# Patient Record
Sex: FEMALE | Race: WHITE
[De-identification: names, ages, dates, MRNs, and addresses within clinical notes are randomized per-mention and may not be internally consistent; named-entity substitution may affect disease eponyms.]

---

## 2017-02-23 ENCOUNTER — HOSPITAL ENCOUNTER (OUTPATIENT)
Dept: HOSPITAL 45 - C.LAB1850 | Age: 22
Discharge: HOME | End: 2017-02-23
Attending: INTERNAL MEDICINE
Payer: COMMERCIAL

## 2017-02-23 DIAGNOSIS — J45.909: Primary | ICD-10-CM

## 2017-02-23 LAB
BASOPHILS # BLD: 0.01 K/UL (ref 0–0.2)
BASOPHILS NFR BLD: 0.1 %
COMPLETE: YES
EOSINOPHIL NFR BLD AUTO: 271 K/UL (ref 130–400)
HCT VFR BLD CALC: 41.6 % (ref 37–47)
IG%: 0.2 %
IMM GRANULOCYTES NFR BLD AUTO: 14.3 %
LYMPHOCYTES # BLD: 1.4 K/UL (ref 1.2–3.4)
MCH RBC QN AUTO: 30.9 PG (ref 25–34)
MCHC RBC AUTO-ENTMCNC: 35.1 G/DL (ref 32–36)
MCV RBC AUTO: 87.9 FL (ref 80–100)
MONOCYTES NFR BLD: 4.2 %
NEUTROPHILS # BLD AUTO: 0.1 %
NEUTROPHILS NFR BLD AUTO: 81.1 %
PMV BLD AUTO: 11.2 FL (ref 7.4–10.4)
RBC # BLD AUTO: 4.73 M/UL (ref 4.2–5.4)
WBC # BLD AUTO: 9.76 K/UL (ref 4.8–10.8)

## 2017-02-28 ENCOUNTER — HOSPITAL ENCOUNTER (OUTPATIENT)
Dept: HOSPITAL 45 - C.CTS | Age: 22
Discharge: HOME | End: 2017-02-28
Attending: INTERNAL MEDICINE
Payer: COMMERCIAL

## 2017-02-28 DIAGNOSIS — R05: Primary | ICD-10-CM

## 2017-02-28 DIAGNOSIS — J32.4: ICD-10-CM

## 2017-02-28 NOTE — DIAGNOSTIC IMAGING REPORT
CT SCAN OF THE PARANASAL SINUSES



CLINICAL HISTORY: Cough. Sinusitis.



COMPARISON STUDY:  No priors.



TECHNIQUE:  High-resolution CT scan of the paranasal sinuses is performed.

Images are reviewed in the axial, sagittal, and coronal planes. IV contrast was

not administered for this examination.



CT DOSE: 609.85 mGy.cm



FINDINGS:



Maxillary antra: Mild mucosal thickening seen on the right. Moderate to advanced

because of thickening within air-fluid level seen on the left. Hyperdense

secretions are noted in the left.



Anterior ethmoid sinuses: Opacified on the left, moderate to advanced mucosal

thickening on the right.



Posterior ethmoid sinuses: Subtotally opacified on the left. Mild mucosal

thickening on the right.



Sphenoid sinuses: Moderate mucosal thickening with air-fluid levels is seen

bilaterally.



Frontal sinuses: Mild to moderate mucosal thickening is seen on the right.

Moderate mucosal thickening is seen on the left with an air-fluid level.



Ostiomeatal complexes: Occluded bilaterally.



Frontoethmoidal and sphenoethmoidal recesses: The left sphenoethmoidal recess is

occluded. The right sphenoethmoidal recess is severely narrowed by mucosal

thickening but remains patent. The frontoethmoidal recesses are occluded

bilaterally.



Carotid arteries: The carotid arteries are covered and without septal

attachments.



Ethmoid roofs: There is asymmetric elevation of the left ethmoid roof as

compared to the right.



Nasal turbinates: Normal in appearance.



Nasal septum: There is rightward deviation of the bony nasal septum with a bony

spur.



Optic nerves: Covered.



Orbits: The bony orbits are intact. Orbital contents are normal in appearance.



Calvarium: The imaged calvarium is normal in appearance



Mastoid air cells: Well pneumatized.



Brain parenchyma: Partially visualized brain parenchyma is within normal limits.





IMPRESSION: Pansinusitis as above.







Electronically signed by:  Serge Chavez M.D.

2/28/2017 10:05 AM



Dictated Date/Time:  2/28/2017 9:59 AM

## 2017-03-21 ENCOUNTER — HOSPITAL ENCOUNTER (OUTPATIENT)
Dept: HOSPITAL 45 - C.LAB1850 | Age: 22
Discharge: HOME | End: 2017-03-21
Attending: INTERNAL MEDICINE
Payer: COMMERCIAL

## 2017-03-21 DIAGNOSIS — R07.9: Primary | ICD-10-CM

## 2018-04-07 ENCOUNTER — HOSPITAL ENCOUNTER (EMERGENCY)
Dept: HOSPITAL 45 - C.EDB | Age: 23
LOS: 1 days | Discharge: HOME | End: 2018-04-08
Payer: COMMERCIAL

## 2018-04-07 VITALS
HEIGHT: 62.01 IN | WEIGHT: 109.13 LBS | WEIGHT: 109.13 LBS | HEIGHT: 62.01 IN | BODY MASS INDEX: 19.83 KG/M2 | BODY MASS INDEX: 19.83 KG/M2

## 2018-04-07 DIAGNOSIS — Z98.1: ICD-10-CM

## 2018-04-07 DIAGNOSIS — Z88.8: ICD-10-CM

## 2018-04-07 DIAGNOSIS — Z88.0: ICD-10-CM

## 2018-04-07 DIAGNOSIS — R10.13: Primary | ICD-10-CM

## 2018-04-07 DIAGNOSIS — J45.909: ICD-10-CM

## 2018-04-07 DIAGNOSIS — Z88.1: ICD-10-CM

## 2018-04-07 DIAGNOSIS — Z82.49: ICD-10-CM

## 2018-04-07 LAB
ALBUMIN SERPL-MCNC: 4.9 GM/DL (ref 3.4–5)
ALP SERPL-CCNC: 79 U/L (ref 45–117)
ALT SERPL-CCNC: 26 U/L (ref 12–78)
AST SERPL-CCNC: 18 U/L (ref 15–37)
BASOPHILS # BLD: 0.01 K/UL (ref 0–0.2)
BASOPHILS NFR BLD: 0.2 %
BUN SERPL-MCNC: 12 MG/DL (ref 7–18)
CALCIUM SERPL-MCNC: 10.2 MG/DL (ref 8.5–10.1)
CO2 SERPL-SCNC: 25 MMOL/L (ref 21–32)
CREAT SERPL-MCNC: 0.8 MG/DL (ref 0.6–1.2)
EOS ABS #: 0.04 K/UL (ref 0–0.5)
EOSINOPHIL NFR BLD AUTO: 216 K/UL (ref 130–400)
GLUCOSE SERPL-MCNC: 85 MG/DL (ref 70–99)
HCT VFR BLD CALC: 47.1 % (ref 37–47)
HGB BLD-MCNC: 16.9 G/DL (ref 12–16)
IG#: 0.01 K/UL (ref 0–0.02)
IMM GRANULOCYTES NFR BLD AUTO: 24.9 %
LIPASE: 263 U/L (ref 73–393)
LYMPHOCYTES # BLD: 1.55 K/UL (ref 1.2–3.4)
MCH RBC QN AUTO: 31.6 PG (ref 25–34)
MCHC RBC AUTO-ENTMCNC: 35.9 G/DL (ref 32–36)
MCV RBC AUTO: 88.2 FL (ref 80–100)
MONO ABS #: 0.58 K/UL (ref 0.11–0.59)
MONOCYTES NFR BLD: 9.3 %
NEUT ABS #: 4.04 K/UL (ref 1.4–6.5)
NEUTROPHILS # BLD AUTO: 0.6 %
NEUTROPHILS NFR BLD AUTO: 64.8 %
PMV BLD AUTO: 11.3 FL (ref 7.4–10.4)
POTASSIUM SERPL-SCNC: 3.2 MMOL/L (ref 3.5–5.1)
PROT SERPL-MCNC: 9.4 GM/DL (ref 6.4–8.2)
RED CELL DISTRIBUTION WIDTH CV: 12.2 % (ref 11.5–14.5)
RED CELL DISTRIBUTION WIDTH SD: 39 FL (ref 36.4–46.3)
SODIUM SERPL-SCNC: 137 MMOL/L (ref 136–145)
WBC # BLD AUTO: 6.23 K/UL (ref 4.8–10.8)

## 2018-04-07 NOTE — EMERGENCY ROOM VISIT NOTE
History


Report prepared by Gayatri:  Richard Wells


Under the Supervision of:  Dr. Israel High M.D.


First contact with patient:  20:01


Chief Complaint:  ABDOMINAL PAIN


Stated Complaint:  ABD PAIN


Nursing Triage Summary:  


lower abd pain that radiates to back





History of Present Illness


The patient is a 22 year old female who presents to the Emergency Room with 

complaints of waxing and waning abdominal pain that started a couple months 

ago. She states that she has been feeling generally unwell over the past couple 

months, with mostly constant nausea and general fatigue. The patient states 

that she has also had muscle, joint, and bone pain "all over". She says that 

eating makes her abdominal pain worse. The patient notes that last night, she 

laid down and her legs went "dead", which was followed up by abdominal pain, 

and more full body pain. She notes that she has to force herself to eat due to 

her nausea. The patient says that while taking a bath earlier today, she had 

yellow vaginal discharge. She notes that she saw her OB/GYN 2 days ago and 

everything seemed normal, but she did not have cultures done. The patient adds 

that she has seen her primary care physician for her symptoms, and had an 

ultrasound done of her gallbladder, which came back normal. She notes that she 

has been constipated recently. The patient states that for the past several 

years, she has been getting lightheaded when sitting up. She got tested for 

POTS and it was negative. The patient says that she got checked for Lyme a year 

ago and it was negative. She notes that she has not noticed any tick bites or 

weird rashes, and she does not work in the woods. She says that her last 

menstrual period ended 2 days ago, and she denies any chance of retaining a 

tampon or chance or pregnancy.





   Source of History:  patient


   Onset:  A couple months ago


   Position:  abdomen


   Quality:  other (pain)


   Timing:  waxes/wanes


   Modifying Factors (Worsening):  eating


   Associated Symptoms:  + nausea, + fatigue


Note:


Associated symptoms: Full body pain. Yellow vaginal discharge yesterday. Legs 

"went dead" last night.





Review of Systems


See HPI for pertinent positives & negatives. A total of 10 systems reviewed and 

were otherwise negative.





Past Medical & Surgical


Medical Problems:


(1) Asthma


(2) Chronic fatigue


Surgical Problems:


(1) History of spinal fusion








Family History





Cancer


Heart disease


Hypertension





Social History


Smoking Status:  Never Smoker


Drug Use:  none


Marital Status:  single


Occupation Status:  employed





Current/Historical Medications


Scheduled


Fexofenadine Hcl (Allegra), 180 MG PO DAILY


Montelukast Sodium (Singulair), 10 MG PO DAILY





Allergies


Coded Allergies:  


     Amoxicillin (Unverified  Allergy, Unknown, ., 4/7/18)


     Cephalexin (Unverified  Allergy, Unknown, ., 4/7/18)


     Ciprofloxacin (Unverified  Allergy, Unknown, ., 4/7/18)


     Citalopram (Unverified  Allergy, Unknown, ., 4/7/18)





Physical Exam


Vital Signs











  Date Time  Temp Pulse Resp B/P (MAP) Pulse Ox O2 Delivery O2 Flow Rate FiO2


 


4/8/18 00:04 36.3 82 18 108/66 99   


 


4/7/18 22:44  90 18 113/68 99 Room Air  


 


4/7/18 21:29  92 18 113/68 96 Room Air  


 


4/7/18 20:31  92      


 


4/7/18 20:28  78 21 115/66 100 Room Air  


 


4/7/18 19:49 36.3 130 18 121/74 99 Room Air  











Physical Exam


GENERAL: Awake, alert, tearful on exam, in no acute distress


HENT: Normocephalic, atraumatic. Oropharynx unremarkable.


EYES: Normal conjunctiva. Sclera non-icteric.


NECK: Supple. No nuchal rigidity. FROM. No JVD.


RESPIRATORY: Clear to auscultation.


CARDIAC: Regular rate, normal rhythm. Extremities warm and well perfused. 

Pulses equal.


ABDOMEN: Soft, non-distended. Tender in the left lower quadrant. No rebound or 

guarding. No masses.


RECTAL: Deferred.


MUSCULOSKELETAL: Chest examination reveals no tenderness. The back is 

symmetrical on inspection without obvious abnormality. There is no CVA 

tenderness to palpation. No joint edema. 


LOWER EXTREMITIES: Calves are equal size bilaterally and non-tender. No edema. 

No discoloration. 


NEURO: Normal sensorium. No sensory or motor deficits noted. 


SKIN: No rash or jaundice noted.





Medical Decision & Procedures


ER Provider


Diagnostic Interpretation:


CT ABD/PELVIS IV AND ORAL CONT





CLINICAL HISTORY: Diffuse abdominal pain    





COMPARISON STUDY:  None.





TECHNIQUE: Following the IV administration of 91 mL of Optiray-320, CT scan of


the abdomen and pelvis was performed from the lung bases to the proximal femurs.


Images are reviewed in the axial, sagittal, and coronal planes. IV contrast was


administered without complication.  A dose lowering technique was utilized


adhering to the principles of ALARA.








CT DOSE: 252.11 mGy.cm





FINDINGS:





Lower chest: The heart is normal in size and configuration, without pericardial


effusion. The lung bases and pleural spaces are clear.





Liver: The contrast-enhanced liver is normal in size, contour, and attenuation.


There is no intrahepatic biliary ductal dilatation. The hepatic veins and portal


veins are patent.





Gallbladder: Unremarkable.





Spleen: Normal in size and attenuation.





Pancreas: Unremarkable.





Adrenal glands: Unremarkable.





Kidneys: There is symmetric renal cortical enhancement. The kidneys are normal


in size without hydronephrosis.





Bowel: There are no transition zones to indicate bowel obstruction. There is no


acute diverticulitis. The appendix appears normal.





Peritoneum: There is no intraperitoneal free air or abdominal ascites.





Vasculature: The abdominal aorta is normal in course and caliber.





Adenopathy: None.





Pelvic viscera: The bladder, and pelvic viscera are unremarkable.





Skeletal structures: There are postsurgical changes of thoracolumbar spinal


rodding. There is secondary artifact from the metallic hardware. There is an old


ununited right ninth rib fracture.





IMPRESSION:  


1. Postsurgical changes within the spine with secondary streak artifact


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


3. No acute intra-abdominal or pelvic findings.


4. No evidence of bowel obstruction. No evidence of free air


5. Normal appendix. No evidence of acute diverticulitis.











Electronically signed by:  Chaz Gray M.D.


4/8/2018 6:45 AM





Dictated Date/Time:  4/8/2018 6:42 AM





 The status of this report is Signed.   


 Draft = Not yet reviewed or approved by Radiologist.  


 Signed = Reviewed and approved by Radiologist.





Laboratory Results


4/7/18 20:20








Red Blood Count 5.34, Mean Corpuscular Volume 88.2, Mean Corpuscular Hemoglobin 

31.6, Mean Corpuscular Hemoglobin Concent 35.9, Mean Platelet Volume 11.3, 

Neutrophils (%) (Auto) 64.8, Lymphocytes (%) (Auto) 24.9, Monocytes (%) (Auto) 

9.3, Eosinophils (%) (Auto) 0.6, Basophils (%) (Auto) 0.2, Neutrophils # (Auto) 

4.04, Lymphocytes # (Auto) 1.55, Monocytes # (Auto) 0.58, Eosinophils # (Auto) 

0.04, Basophils # (Auto) 0.01





4/7/18 20:20

















Test


  4/7/18


20:20 4/7/18


21:25


 


White Blood Count


  6.23 K/uL


(4.8-10.8) 


 


 


Red Blood Count


  5.34 M/uL


(4.2-5.4) 


 


 


Hemoglobin


  16.9 g/dL


(12.0-16.0) 


 


 


Hematocrit 47.1 % (37-47)  


 


Mean Corpuscular Volume


  88.2 fL


() 


 


 


Mean Corpuscular Hemoglobin


  31.6 pg


(25-34) 


 


 


Mean Corpuscular Hemoglobin


Concent 35.9 g/dl


(32-36) 


 


 


Platelet Count


  216 K/uL


(130-400) 


 


 


Mean Platelet Volume


  11.3 fL


(7.4-10.4) 


 


 


Neutrophils (%) (Auto) 64.8 %  


 


Lymphocytes (%) (Auto) 24.9 %  


 


Monocytes (%) (Auto) 9.3 %  


 


Eosinophils (%) (Auto) 0.6 %  


 


Basophils (%) (Auto) 0.2 %  


 


Neutrophils # (Auto)


  4.04 K/uL


(1.4-6.5) 


 


 


Lymphocytes # (Auto)


  1.55 K/uL


(1.2-3.4) 


 


 


Monocytes # (Auto)


  0.58 K/uL


(0.11-0.59) 


 


 


Eosinophils # (Auto)


  0.04 K/uL


(0-0.5) 


 


 


Basophils # (Auto)


  0.01 K/uL


(0-0.2) 


 


 


RDW Standard Deviation


  39.0 fL


(36.4-46.3) 


 


 


RDW Coefficient of Variation


  12.2 %


(11.5-14.5) 


 


 


Immature Granulocyte % (Auto) 0.2 %  


 


Immature Granulocyte # (Auto)


  0.01 K/uL


(0.00-0.02) 


 


 


Anion Gap


  9.0 mmol/L


(3-11) 


 


 


Est Creatinine Clear Calc


Drug Dose 84.7 ml/min 


  


 


 


Estimated GFR (


American) 119.6 


  


 


 


Estimated GFR (Non-


American 103.2 


  


 


 


BUN/Creatinine Ratio 14.9 (10-20)  


 


Calcium Level


  10.2 mg/dl


(8.5-10.1) 


 


 


Total Bilirubin


  0.5 mg/dl


(0.2-1) 


 


 


Direct Bilirubin


  0.1 mg/dl


(0-0.2) 


 


 


Aspartate Amino Transf


(AST/SGOT) 18 U/L (15-37) 


  


 


 


Alanine Aminotransferase


(ALT/SGPT) 26 U/L (12-78) 


  


 


 


Alkaline Phosphatase


  79 U/L


() 


 


 


Total Protein


  9.4 gm/dl


(6.4-8.2) 


 


 


Albumin


  4.9 gm/dl


(3.4-5.0) 


 


 


Lipase


  263 U/L


() 


 


 


Monoscreen NEG (NEG)  


 


Urine Color  YELLOW 


 


Urine Appearance  CLEAR (CLEAR) 


 


Urine pH  6.0 (4.5-7.5) 


 


Urine Specific Gravity


  


  1.007


(1.000-1.030)


 


Urine Protein  NEG (NEG) 


 


Urine Glucose (UA)  NEG (NEG) 


 


Urine Ketones  1+ (NEG) 


 


Urine Occult Blood  NEG (NEG) 


 


Urine Nitrite  NEG (NEG) 


 


Urine Bilirubin  NEG (NEG) 


 


Urine Urobilinogen  NEG (NEG) 


 


Urine Leukocyte Esterase  SMALL (NEG) 


 


Urine WBC (Auto)  1-5 /hpf (0-5) 


 


Urine RBC (Auto)  0-4 /hpf (0-4) 


 


Urine Hyaline Casts (Auto)  0 /lpf (0-5) 


 


Urine Epithelial Cells (Auto)


  


  5-10 /lpf


(0-5)


 


Urine Bacteria (Auto)  NEG (NEG) 


 


Urine Pregnancy Test  NEG (NEG) 





Labs reviewed by ED physician.





Medications Administered











 Medications


  (Trade)  Dose


 Ordered  Sig/Umm


 Route  Start Time


 Stop Time Status Last Admin


Dose Admin


 


 Sodium Chloride  1,000 ml @ 


 999 mls/hr  Q1H1M STAT


 IV  4/7/18 20:13


 4/7/18 21:13 DC 4/7/18 20:24


999 MLS/HR


 


 Miscellaneous


 Medication


  (Gi Cocktail)  24 ml  NOW  STAT


 PO  4/7/18 20:13


 4/7/18 20:15 DC 4/7/18 20:23


24 ML


 


 Famotidine


  (Pepcid Tab)  20 mg  NOW  STAT


 PO  4/7/18 20:13


 4/7/18 20:15 DC 4/7/18 20:23


20 MG


 


 Sucralfate


  (Carafate Tab)  1 gm  NOW  STAT


 PO  4/7/18 20:13


 4/7/18 20:15 DC 4/7/18 20:23


1 GM


 


 Metoclopramide HCl


  (Reglan Inj)  10 mg  NOW  STAT


 IV  4/7/18 21:13


 4/7/18 21:14 DC 4/7/18 21:24


10 MG


 


 Potassium Chloride


  (Klor-Con M10)  40 meq  STK-MED ONCE


 .ROUTE  4/7/18 21:20


 4/7/18 21:21 DC 4/7/18 21:24


40 MEQ











ED Course


2005: Past medical records reviewed. The patient was evaluated in room B10. A 

complete history and physical examination was performed. 





2013: Carafate Tab 1 gm PO, Pepcid Tab 20 mg PO, GI Cocktail 24 ml PO, NSS 1000 

ml @ 999 mls/hr IV.





2113: Reglan Inj 10 mg IV.





2114: Klor-Con Tab 40 meq PO.





2147: I reevaluated the patient and she is doing better.





Medical Decision


Differential diagnosis:


Etiologies such as appendicitis, diverticulitis, PUD, biliary pathology, UTI, 

pancreatitis, obstruction, mesenteric ischemia, aortic pathology, infections, 

inflammatory bowel disease, renal colic, as well as others were entertained. 





This is a 22-year-old female who presents the emergency department complaining 

of epigastric pain.  The patient was sent for CAT scan of the abdomen and 

pelvis however this does not show any acute process.  In addition the patient 

was given a GI cocktail, Pepcid and Carafate along with Reglan.  Repeat 

abdominal examination revealed improvement in the patient's symptoms.  I do 

feel that the patient is well enough to be discharged home however I stressed 

the need for clear liquid diet over the next 48 hours along with Pepcid or 

Prilosec at bedtime.  In addition serial abdominal examinations were performed 

on the patient in the emergency department and at no time to the patient 

exhibited a surgical abdomen.  Patient and mother were in agreement with the 

treatment plan.





Medication Reconcilliation


Current Medication List:  was personally reviewed by me





Blood Pressure Screening


Patient's blood pressure:  Normal blood pressure





Impression





 Primary Impression:  


 Abdominal pain





Scribe Attestation


The scribe's documentation has been prepared under my direction and personally 

reviewed by me in its entirety. I confirm that the note above accurately 

reflects all work, treatment, procedures, and medical decision making performed 

by me.





Departure Information


Dispostion


Home / Self-Care





Referrals


Lety Arguelles D.O. (PCP)





Patient Instructions


My [facility]





Problem Qualifiers








 Primary Impression:  


 Abdominal pain


 Abdominal location:  epigastric  Qualified Codes:  R10.13 - Epigastric pain

## 2018-04-08 VITALS
OXYGEN SATURATION: 99 % | HEART RATE: 82 BPM | DIASTOLIC BLOOD PRESSURE: 66 MMHG | TEMPERATURE: 97.34 F | SYSTOLIC BLOOD PRESSURE: 108 MMHG

## 2018-04-08 NOTE — DIAGNOSTIC IMAGING REPORT
CT ABD/PELVIS IV AND ORAL CONT



CLINICAL HISTORY: Diffuse abdominal pain    



COMPARISON STUDY:  None.



TECHNIQUE: Following the IV administration of 91 mL of Optiray-320, CT scan of

the abdomen and pelvis was performed from the lung bases to the proximal femurs.

Images are reviewed in the axial, sagittal, and coronal planes. IV contrast was

administered without complication.  A dose lowering technique was utilized

adhering to the principles of ALARA.





CT DOSE: 252.11 mGy.cm



FINDINGS:



Lower chest: The heart is normal in size and configuration, without pericardial

effusion. The lung bases and pleural spaces are clear.



Liver: The contrast-enhanced liver is normal in size, contour, and attenuation.

There is no intrahepatic biliary ductal dilatation. The hepatic veins and portal

veins are patent.



Gallbladder: Unremarkable.



Spleen: Normal in size and attenuation.



Pancreas: Unremarkable.



Adrenal glands: Unremarkable.



Kidneys: There is symmetric renal cortical enhancement. The kidneys are normal

in size without hydronephrosis.



Bowel: There are no transition zones to indicate bowel obstruction. There is no

acute diverticulitis. The appendix appears normal.



Peritoneum: There is no intraperitoneal free air or abdominal ascites.



Vasculature: The abdominal aorta is normal in course and caliber.



Adenopathy: None.



Pelvic viscera: The bladder, and pelvic viscera are unremarkable.



Skeletal structures: There are postsurgical changes of thoracolumbar spinal

rodding. There is secondary artifact from the metallic hardware. There is an old

ununited right ninth rib fracture.



IMPRESSION:  

1. Postsurgical changes within the spine with secondary streak artifact

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

3. No acute intra-abdominal or pelvic findings.

4. No evidence of bowel obstruction. No evidence of free air

5. Normal appendix. No evidence of acute diverticulitis.







Electronically signed by:  Chaz Gray M.D.

4/8/2018 6:45 AM



Dictated Date/Time:  4/8/2018 6:42 AM

## 2018-04-10 LAB — EBV EA IGG SER-ACNC: < 9 U/ML

## 2018-04-13 ENCOUNTER — HOSPITAL ENCOUNTER (EMERGENCY)
Dept: HOSPITAL 45 - C.EDB | Age: 23
Discharge: HOME | End: 2018-04-13
Payer: COMMERCIAL

## 2018-04-13 VITALS
HEIGHT: 62.01 IN | HEIGHT: 62.01 IN | BODY MASS INDEX: 19.19 KG/M2 | WEIGHT: 105.6 LBS | WEIGHT: 105.6 LBS | BODY MASS INDEX: 19.19 KG/M2

## 2018-04-13 VITALS — HEART RATE: 68 BPM | OXYGEN SATURATION: 99 % | DIASTOLIC BLOOD PRESSURE: 70 MMHG | SYSTOLIC BLOOD PRESSURE: 108 MMHG

## 2018-04-13 VITALS — TEMPERATURE: 97.88 F

## 2018-04-13 DIAGNOSIS — R53.82: ICD-10-CM

## 2018-04-13 DIAGNOSIS — Z80.9: ICD-10-CM

## 2018-04-13 DIAGNOSIS — Z79.899: ICD-10-CM

## 2018-04-13 DIAGNOSIS — R10.84: ICD-10-CM

## 2018-04-13 DIAGNOSIS — Z98.1: ICD-10-CM

## 2018-04-13 DIAGNOSIS — R07.9: Primary | ICD-10-CM

## 2018-04-13 DIAGNOSIS — Z88.0: ICD-10-CM

## 2018-04-13 DIAGNOSIS — Z82.49: ICD-10-CM

## 2018-04-13 DIAGNOSIS — Z88.8: ICD-10-CM

## 2018-04-13 DIAGNOSIS — J45.909: ICD-10-CM

## 2018-04-13 DIAGNOSIS — Z88.1: ICD-10-CM

## 2018-04-13 LAB
ALBUMIN SERPL-MCNC: 4.4 GM/DL (ref 3.4–5)
ALP SERPL-CCNC: 60 U/L (ref 45–117)
ALT SERPL-CCNC: 21 U/L (ref 12–78)
AST SERPL-CCNC: 19 U/L (ref 15–37)
BASOPHILS # BLD: 0.01 K/UL (ref 0–0.2)
BASOPHILS NFR BLD: 0.1 %
BUN SERPL-MCNC: 6 MG/DL (ref 7–18)
CALCIUM SERPL-MCNC: 9.4 MG/DL (ref 8.5–10.1)
CO2 SERPL-SCNC: 26 MMOL/L (ref 21–32)
CREAT SERPL-MCNC: 0.86 MG/DL (ref 0.6–1.2)
EOS ABS #: 0.05 K/UL (ref 0–0.5)
EOSINOPHIL NFR BLD AUTO: 220 K/UL (ref 130–400)
GLUCOSE SERPL-MCNC: 84 MG/DL (ref 70–99)
HCT VFR BLD CALC: 42.7 % (ref 37–47)
HGB BLD-MCNC: 15 G/DL (ref 12–16)
IG#: 0.02 K/UL (ref 0–0.02)
IMM GRANULOCYTES NFR BLD AUTO: 20.3 %
LIPASE: 284 U/L (ref 73–393)
LYMPHOCYTES # BLD: 2.06 K/UL (ref 1.2–3.4)
MCH RBC QN AUTO: 30.9 PG (ref 25–34)
MCHC RBC AUTO-ENTMCNC: 35.1 G/DL (ref 32–36)
MCV RBC AUTO: 87.9 FL (ref 80–100)
MONO ABS #: 0.76 K/UL (ref 0.11–0.59)
MONOCYTES NFR BLD: 7.5 %
NEUT ABS #: 7.25 K/UL (ref 1.4–6.5)
NEUTROPHILS # BLD AUTO: 0.5 %
NEUTROPHILS NFR BLD AUTO: 71.4 %
PMV BLD AUTO: 11.1 FL (ref 7.4–10.4)
POTASSIUM SERPL-SCNC: 3.4 MMOL/L (ref 3.5–5.1)
PROT SERPL-MCNC: 8.4 GM/DL (ref 6.4–8.2)
RED CELL DISTRIBUTION WIDTH CV: 12.2 % (ref 11.5–14.5)
RED CELL DISTRIBUTION WIDTH SD: 39.1 FL (ref 36.4–46.3)
SODIUM SERPL-SCNC: 139 MMOL/L (ref 136–145)
WBC # BLD AUTO: 10.15 K/UL (ref 4.8–10.8)

## 2018-04-13 NOTE — EMERGENCY ROOM VISIT NOTE
History


Report prepared by Gayatri:  Che Shah


Under the Supervision of:  Dr. Michael Coles D.O.


First contact with patient:  15:22


Chief Complaint:  CHEST PAIN


Stated Complaint:  CHEST PAIN


Nursing Triage Summary:  


Patient ambulatory to triage with an upright and steady gait, states "I had an 


upper GI performed by Dr. Huggins around 1100 yesterday. I woke up from 

the 


procedure with chest pain in the center of my chest. I have been nauseated and 


have had a lot of heart burn. I had a fever last night throughout the night. I 


have been using tums and prilosec for the pain with no relief."





History of Present Illness


The patient is a 22 year old female who presents to the Emergency Room with 

complaints of constant chest pain beginning this morning. The patient notes 

having heart burn as well. The patient reports she had a upper endoscopy by Dr. Huggins yesterday for ongoing "stomach issues". Per mother, the patient 

did not get any results from her endoscopy yesterday. She states she has had 

worsening generalized abdominal pain for the last week.  Abdominal pain is 

truly been there for the past 3 months.  She notes some nausea. The patient 

notes some vaginal spotting and vaginal discharge today.  Abdominal pain is 

fairly constant in nature now in the left upper quadrant although this can be 

diffuse.  No true exacerbating or remitting factors.  The patient states she 

has had some abdominal cramping which is followed by vaginal bleeding for the 

past couple weeks. She reports difficulty emptying her bladder and abdominal 

pain when she urinates beginning a couple weeks ago. Pt denies headache, change 

in vision, fevers, shortness of breath, nausea, vomiting, diarrhea,  and 

melena. Patient denies diabetes, hypertension, hyperlipidemia, CAD, history of 

sudden death at a young age, and smoking.  Patient denies swelling of calves, 

recent trips, history of immobilization or recent surgery, prior history of DVT

, hemoptysis, history of malignancy, history of smoking, or birth control/

estrogen use. 











The patient had a negative CT on April 7th. 





EGD from 4/12/18 Impression:  Normal esophagus. Biopsied. Normal stomach. 

Biopsied. Normal examined duodenum. Biopsied. 





US PELVIS TRANS-ABDOMINAL/TRANSVAGINAL from 4/11/18 Impression: Unremarkable 

pelvic ultrasound. 





US ABDOMEN from 3/26/18 Impression: Normal sonographic evaluation of the right 

upper quadrant.





   Source of History:  patient


   Onset:  this morning


   Position:  chest


   Quality:  other (pain)


   Timing:  constant


   Associated Symptoms:  + chest pain, + nausea, + abdominal pain, + urinary 

symptoms, No fevers, No headache, No SOB, No vomiting, No diarrhea





Review of Systems


See HPI for pertinent positives & negatives. A total of 10 systems reviewed and 

were otherwise negative.





Past Medical & Surgical


Medical Problems:


(1) Asthma


(2) Chronic fatigue


Surgical Problems:


(1) History of spinal fusion








Family History





Cancer


Heart disease


Hypertension





Social History


Smoking Status:  Never Smoker


Drug Use:  none


Marital Status:  single


Occupation Status:  employed





Current/Historical Medications


Scheduled


Fexofenadine Hcl (Allegra), 180 MG PO DAILY


Montelukast Sodium (Singulair), 10 MG PO DAILY


Sucralfate (Carafate), 1 GM PO QID





Allergies


Coded Allergies:  


     Amoxicillin (Unverified  Allergy, Unknown, ., 4/7/18)


     Cephalexin (Unverified  Allergy, Unknown, ., 4/7/18)


     Ciprofloxacin (Unverified  Allergy, Unknown, ., 4/7/18)


     Citalopram (Unverified  Allergy, Unknown, ., 4/7/18)





Physical Exam


Vital Signs











  Date Time  Temp Pulse Resp B/P (MAP) Pulse Ox O2 Delivery O2 Flow Rate FiO2


 


4/13/18 15:57  76 20 102/65 98 Room Air  


 


4/13/18 14:56 36.6 99 18 120/73 99 Room Air  


 


4/13/18 14:54     99 Room Air  











Physical Exam


GENERAL: Sitting up in bed, alert, well appearing, holding her abdomen, no 

distress, non-toxic 


EYE EXAM: normal conjunctiva. 


OROPHARYNX: no exudate, no erythema, lips, buccal mucosa, and tongue normal and 

mucous membranes are moist


NECK: supple, no nuchal rigidity, no adenopathy, non-tender


CHEST: Minimal tenderness over bilateral anterior chest wall


LUNGS: Clear to auscultation. Normal chest wall mechanics


HEART: no murmurs, S1 normal and S2 normal 


ABDOMEN: mild diffuse tenderness. abdomen soft, normo-active bowel sounds, no 

masses, no rebound or guarding. 


BACK: Back is symmetrical on inspection and there is no deformity, no midline 

tenderness, no CVA tenderness. 


SKIN: no rashes and no bruising 


UPPER EXTREMITIES: upper extremities are grossly normal. 


LOWER EXTREMITIES: No pitting edema. Calves equal bilaterally. 


NEURO EXAM: Normal sensorium, cranial nerves II-XII grossly intact, normal 

speech,  no gross weakness of arms, no gross weakness of legs.





Medical Decision & Procedures


ER Provider


Diagnostic Interpretation:


Radiology results as stated below per my review and the radiologist's 

interpretation: 





CHEST ONE VIEW PORTABLE





FINDINGS:


Cardiomediastinal silhouette normal. Mild hyperinflation. No focal opacity. No


large effusion or pneumothorax. Extended thoracolumbar fusion hardware. No


significant scoliosis. Upper abdomen normal.





IMPRESSION:


1.  No acute cardiopulmonary disease.








Electronically signed by:  Donnie Avery M.D.





KUB





FINDINGS:


Nonobstructive bowel gas pattern. Mild stool burden throughout the colon. No


gross pneumoperitoneum.





Allowing for bowel gas and stool, no calcifications to suggest nephrolithiasis.





Thoracolumbar posterior fusion hardware. No significant scoliosis. No acute


osseous injury is apparent. Lung bases clear. 





IMPRESSION:


1.  No acute intra-abdominal pathology.











Electronically signed by:  Donnie Avery M.D.





Laboratory Results


4/13/18 15:36








Red Blood Count 4.86, Mean Corpuscular Volume 87.9, Mean Corpuscular Hemoglobin 

30.9, Mean Corpuscular Hemoglobin Concent 35.1, Mean Platelet Volume 11.1, 

Neutrophils (%) (Auto) 71.4, Lymphocytes (%) (Auto) 20.3, Monocytes (%) (Auto) 

7.5, Eosinophils (%) (Auto) 0.5, Basophils (%) (Auto) 0.1, Neutrophils # (Auto) 

7.25, Lymphocytes # (Auto) 2.06, Monocytes # (Auto) 0.76, Eosinophils # (Auto) 

0.05, Basophils # (Auto) 0.01





4/13/18 15:36

















Test


  4/13/18


15:36 4/13/18


17:05


 


White Blood Count


  10.15 K/uL


(4.8-10.8) 


 


 


Red Blood Count


  4.86 M/uL


(4.2-5.4) 


 


 


Hemoglobin


  15.0 g/dL


(12.0-16.0) 


 


 


Hematocrit 42.7 % (37-47)  


 


Mean Corpuscular Volume


  87.9 fL


() 


 


 


Mean Corpuscular Hemoglobin


  30.9 pg


(25-34) 


 


 


Mean Corpuscular Hemoglobin


Concent 35.1 g/dl


(32-36) 


 


 


Platelet Count


  220 K/uL


(130-400) 


 


 


Mean Platelet Volume


  11.1 fL


(7.4-10.4) 


 


 


Neutrophils (%) (Auto) 71.4 %  


 


Lymphocytes (%) (Auto) 20.3 %  


 


Monocytes (%) (Auto) 7.5 %  


 


Eosinophils (%) (Auto) 0.5 %  


 


Basophils (%) (Auto) 0.1 %  


 


Neutrophils # (Auto)


  7.25 K/uL


(1.4-6.5) 


 


 


Lymphocytes # (Auto)


  2.06 K/uL


(1.2-3.4) 


 


 


Monocytes # (Auto)


  0.76 K/uL


(0.11-0.59) 


 


 


Eosinophils # (Auto)


  0.05 K/uL


(0-0.5) 


 


 


Basophils # (Auto)


  0.01 K/uL


(0-0.2) 


 


 


RDW Standard Deviation


  39.1 fL


(36.4-46.3) 


 


 


RDW Coefficient of Variation


  12.2 %


(11.5-14.5) 


 


 


Immature Granulocyte % (Auto) 0.2 %  


 


Immature Granulocyte # (Auto)


  0.02 K/uL


(0.00-0.02) 


 


 


Anion Gap


  7.0 mmol/L


(3-11) 


 


 


Est Creatinine Clear Calc


Drug Dose 76.3 ml/min 


  


 


 


Estimated GFR (


American) 109.6 


  


 


 


Estimated GFR (Non-


American 94.6 


  


 


 


BUN/Creatinine Ratio 7.2 (10-20)  


 


Calcium Level


  9.4 mg/dl


(8.5-10.1) 


 


 


Total Bilirubin


  0.7 mg/dl


(0.2-1) 


 


 


Direct Bilirubin


  0.1 mg/dl


(0-0.2) 


 


 


Aspartate Amino Transf


(AST/SGOT) 19 U/L (15-37) 


  


 


 


Alanine Aminotransferase


(ALT/SGPT) 21 U/L (12-78) 


  


 


 


Alkaline Phosphatase


  60 U/L


() 


 


 


Troponin I


  < 0.015 ng/ml


(0-0.045) 


 


 


Total Protein


  8.4 gm/dl


(6.4-8.2) 


 


 


Albumin


  4.4 gm/dl


(3.4-5.0) 


 


 


Lipase


  284 U/L


() 


 


 


Lyme Disease IgG Antibody NEG (NEG)  


 


Lyme Disease IgM Antibody NEG (NEG)  


 


Urine Color  YELLOW 


 


Urine Appearance  CLEAR (CLEAR) 


 


Urine pH  7.5 (4.5-7.5) 


 


Urine Specific Gravity


  


  1.012


(1.000-1.030)


 


Urine Protein  NEG (NEG) 


 


Urine Glucose (UA)  NEG (NEG) 


 


Urine Ketones  TRACE (NEG) 


 


Urine Occult Blood  NEG (NEG) 


 


Urine Nitrite  NEG (NEG) 


 


Urine Bilirubin  NEG (NEG) 


 


Urine Urobilinogen  NEG (NEG) 


 


Urine Leukocyte Esterase  TRACE (NEG) 


 


Urine WBC (Auto)  1-5 /hpf (0-5) 


 


Urine RBC (Auto)  0-4 /hpf (0-4) 


 


Urine Hyaline Casts (Auto)  0 /lpf (0-5) 


 


Urine Epithelial Cells (Auto)


  


  5-10 /lpf


(0-5)


 


Urine Bacteria (Auto)  NEG (NEG) 


 


Urine Pregnancy Test  NEG (NEG) 





Laboratory results per my review.





Medications Administered











 Medications


  (Trade)  Dose


 Ordered  Sig/Umm


 Route  Start Time


 Stop Time Status Last Admin


Dose Admin


 


 Ondansetron HCl


  (Zofran Inj)  4 mg  NOW  STAT


 IV  4/13/18 15:32


 4/13/18 15:34 DC 4/13/18 15:56


4 MG


 


 Ketorolac


 Tromethamine


  (Toradol Inj)  30 mg  NOW  STAT


 IV  4/13/18 15:32


 4/13/18 15:34 DC 4/13/18 15:56


30 MG


 


 Sodium Chloride  1,000 ml @ 


 999 mls/hr  Q1H1M STAT


 IV  4/13/18 15:32


 4/13/18 16:32 DC 4/13/18 15:32


999 MLS/HR


 


 Lidocaine HCl


  (Viscous


 Lidocaine 2% Soln)  20 ml  STK-MED ONCE


 .ROUTE  4/13/18 15:53


 4/13/18 15:54 DC 4/13/18 15:55


20 ML


 


 Al Hydroxide/Mg


 Hydroxide


  (Maalox Susp)  30 ml  STK-MED ONCE


 .ROUTE  4/13/18 15:53


 4/13/18 15:54 DC 4/13/18 15:55


30 ML


 


 Sodium Chloride  1,000 ml @ 


 999 mls/hr  Q1H1M STAT


 IV  4/13/18 16:32


 4/13/18 17:32 DC 4/13/18 16:32


999 MLS/HR











ECG Per My Interpretation


Indication:  chest pain


Rate (beats per minute):  75


Findings:  other (normal axis, intraventricular conduction delay, poor baseline 

)





ED Course


ED COURSE: 


Vital signs were reviewed and showed normal


The patients medical record was reviewed


The above diagnostic studies were performed and reviewed.


ED treatments and interventions as stated above. 





1523: The patient was evaluated in room C6. A complete history and physical 

examination was performed.





1532: Ordered Sodium Chloride 1000 ml @ 999 mls/hr IV, Toradol Inj 30 mg IV, 

Zofran Inj 4 mg IV, GI cocktail 24 ml PO. 





1553: Ordered Maalox Susp 30 ml .ROUTE, Lidocaine HCl 20 ml .ROUTE. 





1604: The patient's previous ultrasounds were recieved.





1632: Ordered Sodium Chloride 1000 ml @ 999 mls/hr IV.





1702: I updated the patient on her test results. Her chest pain is now 

resolved. 





1800: Upon reevaluation, the patient is resting comfortably.I discussed my 

findings with the patient and she understands and agrees with the treatment 

plan.   


Based on the patients age, coexisting illnesses, exam and lab findings the 

decision to treat as an outpatient was made.


The patient remained stable while under my care.


The patient appeared well at the time of discharge.





Medical Decision


Differential diagnoses includes but is not limited to gastritis, peptic ulcer 

disease, GERD, gallbladder disease, pancreatitis, small bowel obstruction, 

acute coronary syndrome, pericarditis, ischemic bowel, irritable bowel disease, 

irritable bowel syndrome, appendicitis, diverticulitis, malignancy, hernia, 

urinary tract infection, torsion, pregnancy/ectopic pregnancy (if female), 

perforation, trauma, infectious. 





Patient is a 22-year-old female presents to ER with abdominal pain and 

epigastric burning checking up the middle of her sternum.  Epigastric burning 

has been tracking up and present since upper endoscopy performed yesterday.  

Abdominal pain has been intermittent and diffuse for the past 3 months.  No 

true exacerbating or remitting factors.  She does have mild diffuse tenderness 

on exam.  Chest x-ray and KUB show no perforation but a large amount of stool. 

UGI was negative but did show some bile in the stomach.  Ultrasound performed 

at the end of March of the right upper quadrant showed no gallstones.  Pelvic 

ultrasound performed on the 11th was completely negative.  CT on the seventh 

was negative.  CBC along with BMP, LFTs, bilirubin and lipase was unremarkable.

  Troponin was negative.  EKG was unremarkable.  Chest pain resolved with a GI 

cocktail.  UA was negative.  Pregnancy was negative.  Lyme was negative.  

Multiple long conversation with patient at bedside and Dr. Cam as this 

patient is a family friend and she was okay with having him involved in the 

case.  Plan was for MiraLAX cleanout.  She will follow-up with GI and PCP as an 

outpatient.  Prescribed Carafate for the reflux.  Do favor this is likely a 

motility issue and possibly gastroparesis.  Vitals were stable.  Patient was 

updated at bedside and discharged to follow-up with PCP and GI as an 

outpatient. Discussed with Pt concerning signs and symptoms to watch out for. 

Pt was instructed to follow up with their PCP and discussed with the patient 

their option to return to the ED at anytime for persistent or worsening 

symptoms. The appropriate anticipatory guidance and out-patient management, 

including indications for return to the emergency department, were explained at 

length to the patient and understood.





Medication Reconcilliation


Current Medication List:  was personally reviewed by me





Blood Pressure Screening


Patient's blood pressure:  Normal blood pressure





Impression





 Primary Impression:  


 Diffuse abdominal pain


 Additional Impression:  


 Chest pain





Scribe Attestation


The scribe's documentation has been prepared under my direction and personally 

reviewed by me in its entirety. I confirm that the note above accurately 

reflects all work, treatment, procedures, and medical decision making performed 

by me.





Departure Information


Dispostion


Home / Self-Care





Prescriptions





Sucralfate (CARAFATE) 1 Gm Tab


1 GM PO QID, #40 TAB


   Prov: Michael Coles,          4/13/18





Referrals


Nicole Flores, D.OStella (PCP)





Forms


HOME CARE DOCUMENTATION FORM,                                                 

               IMPORTANT VISIT INFORMATION





Patient Instructions


Abdominal Pain - [facility], Chest Pain - [facility], [facility]





Additional Instructions





Please follow up with your primary care doctor with in the next 24 hours.  Any 

worsening of your symptoms, please return to the ED immediately. This includes 

any fevers greater than 100.4, worsening pain, chest pain, shortness breath, 

persistent nausea, vomiting, unable to eat or drink, or any other concerning 

signs or symptoms from your standpoint.





Please take MiraLAX 250 g mixed with 64 ounces of Gatorade.  Please drink 8 

ounces every 15-20 minutes until you have a bowel movement.  At this time 

please stop drinking the MiraLAX and continue to hydrate with regular oral 

liquids.





Problem Qualifiers








 Additional Impression:  


 Chest pain


 Chest pain type:  unspecified  Qualified Codes:  R07.9 - Chest pain, 

unspecified

## 2018-04-13 NOTE — DIAGNOSTIC IMAGING REPORT
KUB



CLINICAL HISTORY: 22 years-old Female presenting with chest pain. 



TECHNIQUE: Single supine view of the abdomen was obtained.



COMPARISON: 4/7/2017.



FINDINGS:

Nonobstructive bowel gas pattern. Mild stool burden throughout the colon. No

gross pneumoperitoneum.



Allowing for bowel gas and stool, no calcifications to suggest nephrolithiasis.



Thoracolumbar posterior fusion hardware. No significant scoliosis. No acute

osseous injury is apparent. Lung bases clear. 



IMPRESSION:

1.  No acute intra-abdominal pathology.







Electronically signed by:  Donnie Avery M.D.

4/13/2018 4:00 PM



Dictated Date/Time:  4/13/2018 3:59 PM

## 2018-04-13 NOTE — DIAGNOSTIC IMAGING REPORT
CHEST ONE VIEW PORTABLE



CLINICAL HISTORY: 22 years-old Female presenting with cp . 



TECHNIQUE: Portable upright AP view of the chest was obtained.



COMPARISON: 2/2/2017.



FINDINGS:

Cardiomediastinal silhouette normal. Mild hyperinflation. No focal opacity. No

large effusion or pneumothorax. Extended thoracolumbar fusion hardware. No

significant scoliosis. Upper abdomen normal.



IMPRESSION:

1.  No acute cardiopulmonary disease.







Electronically signed by:  Donnie Avery M.D.

4/13/2018 3:59 PM



Dictated Date/Time:  4/13/2018 3:58 PM